# Patient Record
Sex: FEMALE | Race: BLACK OR AFRICAN AMERICAN | NOT HISPANIC OR LATINO | URBAN - METROPOLITAN AREA
[De-identification: names, ages, dates, MRNs, and addresses within clinical notes are randomized per-mention and may not be internally consistent; named-entity substitution may affect disease eponyms.]

---

## 2022-07-22 DIAGNOSIS — N39.0 UTI (URINARY TRACT INFECTION): Primary | ICD-10-CM

## 2022-09-08 ENCOUNTER — OFFICE VISIT (OUTPATIENT)
Dept: UROLOGY | Facility: CLINIC | Age: 22
End: 2022-09-08
Payer: COMMERCIAL

## 2022-09-08 DIAGNOSIS — N30.00 ACUTE CYSTITIS WITHOUT HEMATURIA: Primary | ICD-10-CM

## 2022-09-08 LAB
ANION GAP SERPL CALC-SCNC: 11 MMOL/L (ref 8–17)
BUN/CREAT SERPL: 7.8 (ref 6–20)
CALCIUM SERPL-MCNC: 9.5 MG/DL (ref 8.6–10.2)
CARBON DIOXIDE, CO2: 22 MMOL/L (ref 22–29)
CHLORIDE: 107 MMOL/L (ref 98–107)
CREAT SERPL-MCNC: 0.63 MG/DL (ref 0.5–0.9)
GFR ESTIMATION: 119.29
GLUCOSE: 86 MG/DL (ref 74–106)
POC RESIDUAL URINE VOLUME: 1 ML (ref 0–100)
POTASSIUM: 4.8 MMOL/L (ref 3.5–5.1)
SODIUM: 140 MMOL/L (ref 136–145)
UREA NITROGEN (BUN): 4.9 MG/DL (ref 6–20)

## 2022-09-08 PROCEDURE — 99204 OFFICE O/P NEW MOD 45 MIN: CPT | Mod: S$GLB,,, | Performed by: NURSE PRACTITIONER

## 2022-09-08 PROCEDURE — 1159F PR MEDICATION LIST DOCUMENTED IN MEDICAL RECORD: ICD-10-PCS | Mod: CPTII,S$GLB,, | Performed by: NURSE PRACTITIONER

## 2022-09-08 PROCEDURE — 1159F MED LIST DOCD IN RCRD: CPT | Mod: CPTII,S$GLB,, | Performed by: NURSE PRACTITIONER

## 2022-09-08 PROCEDURE — 99204 PR OFFICE/OUTPT VISIT, NEW, LEVL IV, 45-59 MIN: ICD-10-PCS | Mod: S$GLB,,, | Performed by: NURSE PRACTITIONER

## 2022-09-08 PROCEDURE — 1160F RVW MEDS BY RX/DR IN RCRD: CPT | Mod: CPTII,S$GLB,, | Performed by: NURSE PRACTITIONER

## 2022-09-08 PROCEDURE — 51798 US URINE CAPACITY MEASURE: CPT | Mod: S$GLB,,, | Performed by: NURSE PRACTITIONER

## 2022-09-08 PROCEDURE — 51798 POCT BLADDER SCAN: ICD-10-PCS | Mod: S$GLB,,, | Performed by: NURSE PRACTITIONER

## 2022-09-08 PROCEDURE — 1160F PR REVIEW ALL MEDS BY PRESCRIBER/CLIN PHARMACIST DOCUMENTED: ICD-10-PCS | Mod: CPTII,S$GLB,, | Performed by: NURSE PRACTITIONER

## 2022-09-08 RX ORDER — HYDROQUINONE 40 MG/G
CREAM TOPICAL
COMMUNITY

## 2022-09-08 RX ORDER — NORELGESTROMIN AND ETHINYL ESTRADIOL 150; 35 UG/D; UG/D
PATCH TRANSDERMAL
COMMUNITY

## 2022-09-08 RX ORDER — FLUTICASONE PROPIONATE 110 UG/1
AEROSOL, METERED RESPIRATORY (INHALATION)
COMMUNITY
Start: 2022-07-22

## 2022-09-08 RX ORDER — NORELGESTROMIN AND ETHINYL ESTRADIOL 150; 35 UG/D; UG/D
PATCH TRANSDERMAL
COMMUNITY
Start: 2022-06-21

## 2022-09-08 RX ORDER — HYDROXYZINE HYDROCHLORIDE 25 MG/1
TABLET, FILM COATED ORAL
COMMUNITY

## 2022-09-08 RX ORDER — DEXAMETHASONE 4 MG/1
TABLET ORAL
COMMUNITY
Start: 2022-07-22

## 2022-09-08 NOTE — PROGRESS NOTES
Subjective:       Patient ID: Radha Villegas is a 22 y.o. female.    Chief Complaint: No chief complaint on file.      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  She states she has had 2-3 maybe 4 over the last year.  Only 1 to her knowledge is been documented with culture.  Symptoms include low back pain, flank pain, difficulty starting her stream during periods of her symptoms.  She has seen no obvious blood.  She has been afebrile.  Denies constipation.  No tub baths.  Monogamous in a relationship with 1 partner.  She does not associate UTIs to occur post relations.  Main fluid intake is normally Tea soda and occasional alcohol beverage ; some water.       Past Medical History:   Past Medical History:   Diagnosis Date    Urinary tract infection, site not specified        Past Surgical Historical: History reviewed. No pertinent surgical history.     Medications:   Medication List with Changes/Refills   Current Medications    FLOVENT  MCG/ACTUATION INHALER    SPRAY 1 SPRAY INTO EACH NOSTRIL TWICE A DAY    FLUTICASONE PROPIONATE (FLOVENT HFA) 110 MCG/ACTUATION INHALER    Flovent  mcg/actuation aerosol inhaler   SPRAY 1 SPRAY INTO EACH NOSTRIL TWICE A DAY    HYDROQUINONE 4 % CREA    APPLY TO DARK SPOTS BID    HYDROXYZINE HCL (ATARAX) 25 MG TABLET    TK 1 T PO  Q 6 H PRF INSOMNIA/ANXIETY    NORELGESTROMIN-ETHINYL ESTRADIOL (XULANE) 150-35 MCG/24 HR    Xulane 150 mcg-35 mcg/24 hr transdermal patch    XULANE 150-35 MCG/24 HR    apply 1 patch weekly for 3 weeks then 1 week off then RESTART        Past Social History:   Social History     Socioeconomic History    Marital status: Single   Tobacco Use    Passive exposure: Never    Tobacco comments:     vapes       Allergies: Review of patient's allergies indicates:  No Known Allergies     Family History: History reviewed. No pertinent family history.      Review of Systems:  Review of Systems   Constitutional:  Negative for activity change and appetite change.   HENT:  Negative for congestion and dental problem.    Respiratory:  Negative for chest tightness and shortness of breath.    Cardiovascular:  Negative for chest pain.   Gastrointestinal:  Negative for abdominal distention and abdominal pain.   Genitourinary:  Negative for decreased urine volume, difficulty urinating, dyspareunia, dysuria, enuresis, flank pain, frequency, genital sores, hematuria, pelvic pain and urgency.   Musculoskeletal:  Negative for back pain and neck pain.   Allergic/Immunologic: Negative for immunocompromised state.   Neurological:  Negative for dizziness.   Hematological:  Negative for adenopathy.   Psychiatric/Behavioral:  Negative for agitation, behavioral problems and confusion.      Physical Exam:  Physical Exam  Constitutional:       Appearance: She is well-developed.   HENT:      Head: Normocephalic and atraumatic.   Eyes:      General: No scleral icterus.  Pulmonary:      Effort: Pulmonary effort is normal.      Breath sounds: Normal breath sounds.   Abdominal:      General: There is no distension.      Palpations: Abdomen is soft.      Tenderness: There is no abdominal tenderness.   Genitourinary:     Exam position: Supine.      Labia:         Right: No rash, tenderness or lesion.         Left: No rash, tenderness or lesion.       Vagina: Normal.      Rectum: Normal.   Musculoskeletal:      Cervical back: Normal range of motion.   Skin:     General: Skin is warm and dry.   Neurological:      Mental Status: She is alert and oriented to person, place, and time.       Assessment/Plan:   Recurring UTI--urinalysis negative today, will check a postvoid residual, serum BMP and renal ultrasound.  Also gave her a handout on IC letter read through that and see if any of those symptoms are similar to which she experiences.  She has [facility] door to come in for drop off UA should she have  symptoms of UTI while here in town on break.  Problem List Items Addressed This Visit    None

## 2022-09-15 ENCOUNTER — TELEPHONE (OUTPATIENT)
Dept: UROLOGY | Facility: CLINIC | Age: 22
End: 2022-09-15
Payer: COMMERCIAL

## 2022-09-15 NOTE — TELEPHONE ENCOUNTER
----- Message from Jerry Valle NP sent at 9/13/2022  8:36 PM CDT -----  Please notify patient of abnormal test results.  Patient dehydrated, increased water intake

## 2022-09-15 NOTE — TELEPHONE ENCOUNTER
Left message for pt to call back in regards to lab results. ED----- Message from Jerry Valle NP sent at 9/13/2022  8:36 PM CDT -----  Please notify patient of abnormal test results.  Patient dehydrated, increased water intake

## 2022-10-11 ENCOUNTER — TELEPHONE (OUTPATIENT)
Dept: UROLOGY | Facility: CLINIC | Age: 22
End: 2022-10-11
Payer: COMMERCIAL

## 2022-10-11 NOTE — TELEPHONE ENCOUNTER
----- Message from Jaja Alfaro sent at 10/11/2022 10:31 AM CDT -----  Type:  Test Results    Who Called: Radha Villegas    Name of Test (Lab/Mammo/Etc):  Bladder Scan, ultra soumd   Date of Test: last week   Ordering Provider:  DWIGHT Valle   Where the test was performed: office   Would the patient rather a call back or a response via [facility]?    Best Call Back Number: [phone]    Additional Information:

## 2022-10-11 NOTE — TELEPHONE ENCOUNTER
Notified pt of ct results. Pt stated that she understood the results and that she will call and make a follow up apt closer to that 6 month time frame. ED

## 2022-10-12 ENCOUNTER — TELEPHONE (OUTPATIENT)
Dept: UROLOGY | Facility: CLINIC | Age: 22
End: 2022-10-12
Payer: COMMERCIAL

## 2022-10-12 NOTE — TELEPHONE ENCOUNTER
No answer, left message for patient to call back in regards to renal US results.    SUE Cagle RN            ----- Message from Mick Trevino NP sent at 10/6/2022 11:23 AM CDT -----  Ultrasound shows a mildly complex cyst in the left kidney measuring 2 cm.    Recommend follow-up ultrasound in 6 months.     patient was seen by Jerry.  Please schedule follow-up with Jerry.

## 2022-10-12 NOTE — TELEPHONE ENCOUNTER
Notified patient of US results. Informed that she needed to follow up in 6 months with Jerry Valle NP. Patient verbalized understanding & stated that she would like to talk to her mother before scheduling that appointment 7 would call us back.     SUE Cagle RN          ----- Message from Mick Trevino NP sent at 10/6/2022 11:23 AM CDT -----  Ultrasound shows a mildly complex cyst in the left kidney measuring 2 cm.    Recommend follow-up ultrasound in 6 months.     patient was seen by Jerry.  Please schedule follow-up with Jerry.